# Patient Record
Sex: FEMALE | Race: BLACK OR AFRICAN AMERICAN | NOT HISPANIC OR LATINO | Employment: FULL TIME | URBAN - METROPOLITAN AREA
[De-identification: names, ages, dates, MRNs, and addresses within clinical notes are randomized per-mention and may not be internally consistent; named-entity substitution may affect disease eponyms.]

---

## 2023-05-26 ENCOUNTER — HOSPITAL ENCOUNTER (EMERGENCY)
Facility: OTHER | Age: 53
Discharge: HOME | End: 2023-05-26
Attending: EMERGENCY MEDICINE

## 2023-05-26 VITALS
TEMPERATURE: 99 F | RESPIRATION RATE: 18 BRPM | HEART RATE: 88 BPM | BODY MASS INDEX: 35.85 KG/M2 | HEIGHT: 64 IN | WEIGHT: 210 LBS | SYSTOLIC BLOOD PRESSURE: 200 MMHG | DIASTOLIC BLOOD PRESSURE: 106 MMHG | OXYGEN SATURATION: 99 %

## 2023-05-26 DIAGNOSIS — L08.9 INFECTED SEBACEOUS CYST: Primary | ICD-10-CM

## 2023-05-26 DIAGNOSIS — L72.3 INFECTED SEBACEOUS CYST: Primary | ICD-10-CM

## 2023-05-26 PROCEDURE — 25000003 PHARM REV CODE 250: Performed by: EMERGENCY MEDICINE

## 2023-05-26 PROCEDURE — 99284 EMERGENCY DEPT VISIT MOD MDM: CPT | Mod: 25

## 2023-05-26 PROCEDURE — 25000003 PHARM REV CODE 250: Performed by: PHYSICIAN ASSISTANT

## 2023-05-26 PROCEDURE — 10061 I&D ABSCESS COMP/MULTIPLE: CPT

## 2023-05-26 RX ORDER — HYDROCODONE BITARTRATE AND ACETAMINOPHEN 5; 325 MG/1; MG/1
2 TABLET ORAL
Status: COMPLETED | OUTPATIENT
Start: 2023-05-26 | End: 2023-05-26

## 2023-05-26 RX ORDER — MUPIROCIN 20 MG/G
1 OINTMENT TOPICAL
Status: COMPLETED | OUTPATIENT
Start: 2023-05-26 | End: 2023-05-26

## 2023-05-26 RX ORDER — LIDOCAINE HYDROCHLORIDE 10 MG/ML
5 INJECTION INFILTRATION; PERINEURAL
Status: COMPLETED | OUTPATIENT
Start: 2023-05-26 | End: 2023-05-26

## 2023-05-26 RX ORDER — SULFAMETHOXAZOLE AND TRIMETHOPRIM 800; 160 MG/1; MG/1
1 TABLET ORAL 2 TIMES DAILY
Qty: 14 TABLET | Refills: 0 | Status: SHIPPED | OUTPATIENT
Start: 2023-05-26 | End: 2023-06-02

## 2023-05-26 RX ORDER — SULFAMETHOXAZOLE AND TRIMETHOPRIM 800; 160 MG/1; MG/1
1 TABLET ORAL
Status: COMPLETED | OUTPATIENT
Start: 2023-05-26 | End: 2023-05-26

## 2023-05-26 RX ORDER — IBUPROFEN 800 MG/1
800 TABLET ORAL EVERY 6 HOURS PRN
Qty: 30 TABLET | Refills: 0 | Status: SHIPPED | OUTPATIENT
Start: 2023-05-26

## 2023-05-26 RX ADMIN — LIDOCAINE HYDROCHLORIDE 5 ML: 10 INJECTION, SOLUTION INFILTRATION; PERINEURAL at 07:05

## 2023-05-26 RX ADMIN — SULFAMETHOXAZOLE AND TRIMETHOPRIM 1 TABLET: 800; 160 TABLET ORAL at 08:05

## 2023-05-26 RX ADMIN — HYDROCODONE BITARTRATE AND ACETAMINOPHEN 2 TABLET: 5; 325 TABLET ORAL at 07:05

## 2023-05-26 RX ADMIN — MUPIROCIN 22 G: 20 OINTMENT TOPICAL at 08:05

## 2023-05-26 NOTE — FIRST PROVIDER EVALUATION
Emergency Department TeleTriage Encounter Note      CHIEF COMPLAINT    Chief Complaint   Patient presents with    Cyst     C/o cyst to right axillary x 2 wk worst last 2 days. Denies any drainage. Redness, swelling and warmth noted to right axillary. VSS         VITAL SIGNS   Initial Vitals [05/26/23 1644]   BP Pulse Resp Temp SpO2   (!) 186/95 90 18 98.9 °F (37.2 °C) 96 %      MAP       --            ALLERGIES    Review of patient's allergies indicates:  No Known Allergies    PROVIDER TRIAGE NOTE  Patient presents with abscess to right axillar. Pustule developed today, but no drainage. No fever. Denies DM      ORDERS  Labs Reviewed - No data to display    ED Orders (720h ago, onward)      None              Virtual Visit Note: The provider triage portion of this emergency department evaluation and documentation was performed via [employer], a HIPAA-compliant telemedicine application, in concert with a tele-presenter in the room. A face to face patient evaluation with one of my colleagues will occur once the patient is placed in an emergency department room.      DISCLAIMER: This note was prepared with [employer] voice recognition transcription software. Garbled syntax, mangled pronouns, and other bizarre constructions may be attributed to that software system.

## 2023-05-26 NOTE — ED TRIAGE NOTES
Reports a small bump under right arm for 8-9 months that never caused any pain. Over past 2 days, area has become larger, tender, and reddened. No drainage or fever reported. Presents in no distress.

## 2023-05-27 NOTE — DISCHARGE INSTRUCTIONS
Be sure to remove the packing in 24 hours.  After that continue squeezing any further accumulated pus out, frequent warm compresses.    Apply the provided Bactroban antibiotic ointment 3 times daily.

## 2023-05-27 NOTE — ED PROVIDER NOTES
"     Source of History:  Patient    Chief complaint:  Cyst (C/o cyst to right axillary x 2 wk worst last 2 days. Denies any drainage. Redness, swelling and warmth noted to right axillary. VSS  )      HPI:  Sarah Bolaños is a 53 y.o. female presenting with a area of swelling on the right lateral pectoral area for the past year, saw her physician for several weeks ago when it was small and nontender was diagnosed with a cyst.  She has a picture of it at the time which does look to be sebaceous.  However the past couple days it has enlarged, turned red, painful.  No fevers or systemic symptoms.    This is the extent to the patients complaints today here in the emergency department.    ROS: As per HPI and below:  General: No fever.  No chills.  Eyes: No visual changes.   ENT: No sore throat. No ear pain.  Urinary: No abnormal urination.  MSK: No back pain. No joint pain.       Review of patient's allergies indicates:  No Known Allergies    PMH:  As per HPI and below:  Past Medical History:   Diagnosis Date    Elevated blood pressure     Hypertension      Past Surgical History:   Procedure Laterality Date     SECTION      HYSTERECTOMY      TUBAL LIGATION         Social History     Tobacco Use    Smoking status: Never   Substance Use Topics    Alcohol use: Yes     Comment: occ    Drug use: No       Physical Exam:    BP (!) 200/106   Pulse 88   Temp 98.9 °F (37.2 °C) (Oral)   Resp 18   Ht 5' 4" (1.626 m)   Wt 95.3 kg (210 lb)   SpO2 99%   BMI 36.05 kg/m²   Nursing note and vital signs reviewed.  Appearance:  Uncomfortable appearing due to pain  Eyes: No conjunctival injection.  ENT: Normal phonation.  Musculoskeletal: Good range of motion all joints.  No deformities.  Neck supple.  No meningismus. Neurovascularly intact.  Skin:  4 cm area of erythema on right lateral pectoral area, is not within the axilla.  Has central fluctuance.  Good turgor.  No abrasions.  No ecchymoses.  Mental Status:  Alert " and oriented x 3.  Appropriate, conversant.    Labs that have been ordered have been independently reviewed and interpreted by myself.    I & D - Incision and Drainage    Date/Time: 5/26/2023 9:20 PM  Location procedure was performed: [facility] EMERGENCY DEPARTMENT  Performed by: Quinn Montalvo II, MD  Authorized by: Quinn Montalvo II, MD   Type: abscess  Body area: trunk  Location details: chest  Anesthesia: local infiltration    Anesthesia:  Local Anesthetic: lidocaine 1% without epinephrine  Anesthetic total: 4 mL  Scalpel size: 11  Incision type: single straight  Incision depth: dermal  Complexity: complex  Drainage: pus (Sebaceous)  Drainage amount: moderate  Wound treatment: wound packed  Packing material: 1/4 in gauze  Comments: Bactroban and dressing placed    Incision depth: dermal          MDM/ Differential Dx:    53 y.o. female with infected sebaceous cyst.  Incised and drained.  Purulence and sebaceous expressed.  Due to wound depth, packing placed.  Discussed packing removal in 24 hours, continued manual expression of any further purulence.  Warm compresses.  Application of Bactroban.  Follow-up with primary care for wound check in a few days and with Dermatology in several weeks for definitive management                 Diagnostic Impression:    1. Infected sebaceous cyst         ED Disposition Condition    Discharge Stable            ED Prescriptions       Medication Sig Dispense Start Date End Date Auth. Provider    sulfamethoxazole-trimethoprim 800-160mg (BACTRIM DS) 800-160 mg Tab Take 1 tablet by mouth 2 (two) times daily. for 7 days 14 tablet 5/26/2023 6/2/2023 Quinn Montalvo II, MD    ibuprofen (ADVIL,MOTRIN) 800 MG tablet Take 1 tablet (800 mg total) by mouth every 6 (six) hours as needed for Pain. 30 tablet 5/26/2023 -- Quinn Montalvo II, MD          Follow-up Information       Follow up With Specialties Details Why Contact Info    Jerome Bran MD Internal Medicine  For wound  re-check [address]  [facility] [address]  [place] [address]  [phone]      [facility] DERMATOLOGY Dermatology In 2 weeks for evaluation of excision of sebaceous cyst [facility] [address]  [phone]             Quinn Montalvo II, MD  05/26/23 2121